# Patient Record
Sex: FEMALE | Race: OTHER
[De-identification: names, ages, dates, MRNs, and addresses within clinical notes are randomized per-mention and may not be internally consistent; named-entity substitution may affect disease eponyms.]

---

## 2020-04-15 VITALS — DIASTOLIC BLOOD PRESSURE: 52 MMHG | SYSTOLIC BLOOD PRESSURE: 141 MMHG

## 2020-10-19 ENCOUNTER — HOSPITAL ENCOUNTER (OUTPATIENT)
Dept: HOSPITAL 61 - MRI | Age: 60
End: 2020-10-19
Attending: ORTHOPAEDIC SURGERY
Payer: COMMERCIAL

## 2020-10-19 DIAGNOSIS — M19.011: Primary | ICD-10-CM

## 2020-10-19 DIAGNOSIS — M25.411: ICD-10-CM

## 2020-10-19 DIAGNOSIS — M77.9: ICD-10-CM

## 2020-10-19 PROCEDURE — 73221 MRI JOINT UPR EXTREM W/O DYE: CPT

## 2020-10-19 NOTE — RAD
STUDY: MRI of the right shoulder without contrast

 

INDICATION: Right shoulder pain. 

 

COMPARISON: Right shoulder radiographs 3/23/2020 

 

TECHNIQUE: Multiplanar MR imaging of the right shoulder performed without 

the use of intravenous or intra-articular contrast.  

 

FINDINGS:

 

AC joint: Mild AC joint arthrosis. Mild edema-like signal and potential 

trace fluid within the subacromial subdeltoid bursa. 

 

Rotator cuff: Low grade, articular sided tear of the supraspinatus seen 

approximately 1.5 cm medial to the footprint and beginning just posterior 

to the leading edge. This involves up to around 30 percent tendon 

cross-sectional thickness with the region of involvement extending in the 

AP dimension by approximately 1 cm. Background mild supraspinatus 

tendinosis. Somewhat globular T2 signal elevation within the infraspinatus

at the footprint favored on account of tendinosis as opposed to a tear. 

The teres minor is intact as is the subscapularis. No significant rotator 

cuff muscular atrophy/fatty infiltration.

 

Labrum: Degenerative SLAP-type tear extending from superior to 

posterior/superior. 

 

Long head biceps tendon: Intact and normally located. 

 

Cartilage: No discrete chondral defect seen at the humeral head or 

glenoid. 

 

Bones: No acute fracture or aggressive marrow signal abnormality. 

 

Miscellaneous: Unremarkable axillary soft tissues. Small amount of 

shoulder joint fluid without a significant effusion.

 

Impression:

 

1.  As detailed in the body the report, low-grade articular sided tear of 

the supraspinatus occurring at the critical zone. Background mild 

supraspinatus and infraspinatus tendinosis.

2.  Degenerative SLAP-type tear extending from superior to 

posterior/superior.

 

Electronically signed by: ARMANDO SIMS MD (10/19/2020 1:04 PM) 

[id number]